# Patient Record
Sex: MALE | Race: WHITE | Employment: FULL TIME | URBAN - METROPOLITAN AREA
[De-identification: names, ages, dates, MRNs, and addresses within clinical notes are randomized per-mention and may not be internally consistent; named-entity substitution may affect disease eponyms.]

---

## 2019-10-21 ENCOUNTER — OFFICE VISIT (OUTPATIENT)
Dept: CARDIOLOGY CLINIC | Age: 44
End: 2019-10-21

## 2019-10-21 VITALS
HEIGHT: 69 IN | SYSTOLIC BLOOD PRESSURE: 124 MMHG | DIASTOLIC BLOOD PRESSURE: 80 MMHG | WEIGHT: 220 LBS | OXYGEN SATURATION: 96 % | HEART RATE: 72 BPM | BODY MASS INDEX: 32.58 KG/M2 | RESPIRATION RATE: 14 BRPM

## 2019-10-21 DIAGNOSIS — R42 VERTIGO: ICD-10-CM

## 2019-10-21 DIAGNOSIS — R42 DIZZINESS: Primary | ICD-10-CM

## 2019-10-21 DIAGNOSIS — R43.9 DYSOSMIA: ICD-10-CM

## 2019-10-21 NOTE — PROGRESS NOTES
HISTORY OF PRESENT ILLNESS  Justen Monte is a 38 y.o. male. He is seen for initial evaluation of dizziness. Five days ago he had dizziness starting in the morning and persisting for probably ten to twelve hours. He actually drove to [place] as part of his job that day. He noticed that if he kept his head straight looking at the road he did not experience symptoms, but when he moved his head from side to side the dizziness became problematic. When he first experienced the dizziness in the morning it did seem like things were moving and possibly spinning. He has had several minor episodes in the past but they have been short-lived. He is on no medications and has no treated health problems. He does have symptoms of gastric reflux. He drinks twelve to fifteen cups of coffee per day. He has not smoked cigarettes in six years. He walks a lot without chest pain or shortness of breath but does spend about five hours a day in his car. He drinks alcohol on occasion. He has no family history of premature heart disease or unusual diseases. He has occasional mild chest discomfort which occurs every day and used to be worse and apparently, he saw a cardiologist in [place] last year and had a normal EKG and stress test. I do not have those records. He still has tingling in the fingers of his left hand. He also had an episode four to five weeks ago where he woke up in the middle of the night with a very unusual smell like an electrical fire. He checked all the wiring in his house and there was no problem. The smell was so intense that it woke him up. It has happened two to three times since but not quite as bad. He used to have headaches that were quite severe but does not have these at present. HPI  Patient Active Problem List   Diagnosis Code    Dizziness of unknown etiology R42    Vertigo R42    Dysosmia R43.9       History reviewed. No pertinent past medical history.   Past Surgical History: Procedure Laterality Date    HX ACL RECONSTRUCTION         Review of Systems   Cardiovascular: Positive for chest pain. Neurological: Positive for dizziness and tingling. All other systems reviewed and are negative. Visit Vitals  /80 (BP 1 Location: Left arm, BP Patient Position: Sitting)   Pulse 72   Resp 14   Ht 5' 9\" (1.753 m)   Wt 220 lb (99.8 kg)   SpO2 96%   BMI 32.49 kg/m²       Physical Exam   Constitutional: He is oriented to person, place, and time. He appears well-nourished. HENT:   Head: Atraumatic. Eyes: Conjunctivae are normal.   Neck: Neck supple. Cardiovascular: Normal rate, regular rhythm and normal heart sounds. Exam reveals no gallop and no friction rub. No murmur heard. Pulmonary/Chest: Breath sounds normal. He has no wheezes. Abdominal: Bowel sounds are normal. There is no tenderness. Musculoskeletal: He exhibits no edema. Neurological: He is alert and oriented to person, place, and time. No cranial nerve deficit. Skin: Skin is dry. Psychiatric: His behavior is normal.   Nursing note and vitals reviewed. ASSESSMENT and PLAN  The dizziness that he experienced recently does sound like an inner ear problem causing some vertigo especially since the symptoms were improved if he kept his head straight but made worse if he turned it from side to side. However, I am quite concerned about his other symptom of dysosmia. Because of the association of these two symptoms, I feel that he should see a neurologist. I will schedule this today. I will also order a CT scan of the head with contrast to rule out any other processes. He has no signs or symptoms of cardiac disease and I do not feel he needs further cardiac testing at this time. I will also order complete blood work today and we will call him regarding the results of all tests. I will see him back as needed.

## 2019-10-22 LAB
ALBUMIN SERPL-MCNC: 4.3 G/DL (ref 3.5–5.5)
ALBUMIN/GLOB SERPL: 1.5 {RATIO} (ref 1.2–2.2)
ALP SERPL-CCNC: 92 IU/L (ref 39–117)
ALT SERPL-CCNC: 23 IU/L (ref 0–44)
AST SERPL-CCNC: 19 IU/L (ref 0–40)
BILIRUB SERPL-MCNC: <0.2 MG/DL (ref 0–1.2)
BUN SERPL-MCNC: 9 MG/DL (ref 6–24)
BUN/CREAT SERPL: 8 (ref 9–20)
CALCIUM SERPL-MCNC: 9.3 MG/DL (ref 8.7–10.2)
CHLORIDE SERPL-SCNC: 106 MMOL/L (ref 96–106)
CO2 SERPL-SCNC: 22 MMOL/L (ref 20–29)
CREAT SERPL-MCNC: 1.09 MG/DL (ref 0.76–1.27)
ERYTHROCYTE [DISTWIDTH] IN BLOOD BY AUTOMATED COUNT: 13.2 % (ref 12.3–15.4)
ERYTHROCYTE [SEDIMENTATION RATE] IN BLOOD BY WESTERGREN METHOD: 17 MM/HR (ref 0–15)
GLOBULIN SER CALC-MCNC: 2.9 G/DL (ref 1.5–4.5)
GLUCOSE SERPL-MCNC: 85 MG/DL (ref 65–99)
HCT VFR BLD AUTO: 45 % (ref 37.5–51)
HGB BLD-MCNC: 14.8 G/DL (ref 13–17.7)
MCH RBC QN AUTO: 30.1 PG (ref 26.6–33)
MCHC RBC AUTO-ENTMCNC: 32.9 G/DL (ref 31.5–35.7)
MCV RBC AUTO: 92 FL (ref 79–97)
PLATELET # BLD AUTO: 313 X10E3/UL (ref 150–450)
POTASSIUM SERPL-SCNC: 4.9 MMOL/L (ref 3.5–5.2)
PROT SERPL-MCNC: 7.2 G/DL (ref 6–8.5)
RBC # BLD AUTO: 4.91 X10E6/UL (ref 4.14–5.8)
SODIUM SERPL-SCNC: 143 MMOL/L (ref 134–144)
TSH SERPL DL<=0.005 MIU/L-ACNC: 1.7 UIU/ML (ref 0.45–4.5)
WBC # BLD AUTO: 6.8 X10E3/UL (ref 3.4–10.8)

## 2019-10-24 ENCOUNTER — OFFICE VISIT (OUTPATIENT)
Dept: NEUROLOGY | Age: 44
End: 2019-10-24

## 2019-10-24 VITALS
SYSTOLIC BLOOD PRESSURE: 118 MMHG | OXYGEN SATURATION: 96 % | BODY MASS INDEX: 32.29 KG/M2 | DIASTOLIC BLOOD PRESSURE: 75 MMHG | HEIGHT: 69 IN | WEIGHT: 218 LBS | HEART RATE: 84 BPM

## 2019-10-24 DIAGNOSIS — R43.9 DYSOSMIA: Primary | ICD-10-CM

## 2019-10-24 DIAGNOSIS — Z78.9 EXCESSIVE CAFFEINE INTAKE: ICD-10-CM

## 2019-10-24 DIAGNOSIS — R53.83 FATIGUE, UNSPECIFIED TYPE: ICD-10-CM

## 2019-10-24 DIAGNOSIS — R42 VERTIGO: ICD-10-CM

## 2019-10-24 NOTE — PROGRESS NOTES
Neurology Consult Note      HISTORY PROVIDED BY: patient    Chief Complaint:   Chief Complaint   Patient presents with    New Patient     dizzy, latharigic, smells burning occasionally      Subjective:    Annie Larkin is a 38 y.o. right handed male who presents in consultation for dizziness, lethargy, and smelling burning smell. Started last fall with CP, worked up in Cardiology, no etiology found. Sxs improved, but did not go away, still has tightness in chest.  Now c/o feeling fatigued, no energy. A couple of months ago started feeling dizzy, typically only for a few seconds, but last week developed dizziness that lasted most of the day. Pt clarifies dizziness as lightheadedness on a regular basis, can occur any position or activity, even at rest.  The episode last Wednesday was more like severe imbalance, not room spinning, felt like he might fall over and was worse with head turning such as with driving. No change in hearing or ringing in ears, no h/o vertigo. On a couple of occasions he has smelled an electrical burning smell. He has also noticed certain smells do not smell right. He has seen his cardiologist, Dr. Gisell Koenig, who did not find an etiology and suggested he see neurology. No family h/o seizure or stroke, +LOC in childhood with breath holding, passed out and hit head, passed out siphoning gas into lungs and was hospitalized. No h/o CNS infection. No febrile, Normal preg/del. He is sleeping well at night, gets at least 6 hours of sleep a night, snores sometimes, no apneas witnessed. Has h/o \"migraines\", severe HA would wake him at night, +N/V/Photophobia, improved after dec caffeine, but is still drinking 12-15 cups a day, a full pot in AM and then again in afternoons, and before he goes to bed, still with occ mild HA.      Note from Dr. Gisell Koenig 10/21/2019 reviewed-CT of the head ordered, labs completed including CMP and CBC that are unremarkable, TSH is normal, ESR 17.    Past Medical History:   Diagnosis Date    Headache       Past Surgical History:   Procedure Laterality Date    HX ACL RECONSTRUCTION        Social History     Socioeconomic History    Marital status: UNKNOWN     Spouse name: Not on file    Number of children: Not on file    Years of education: Not on file    Highest education level: Not on file   Occupational History    Not on file   Social Needs    Financial resource strain: Not on file    Food insecurity:     Worry: Not on file     Inability: Not on file    Transportation needs:     Medical: Not on file     Non-medical: Not on file   Tobacco Use    Smoking status: Former Smoker    Smokeless tobacco: Never Used   Substance and Sexual Activity    Alcohol use: Yes     Comment: 1-5 a month    Drug use: Never    Sexual activity: Not on file   Lifestyle    Physical activity:     Days per week: Not on file     Minutes per session: Not on file    Stress: Not on file   Relationships    Social connections:     Talks on phone: Not on file     Gets together: Not on file     Attends Hoahaoism service: Not on file     Active member of club or organization: Not on file     Attends meetings of clubs or organizations: Not on file     Relationship status: Not on file    Intimate partner violence:     Fear of current or ex partner: Not on file     Emotionally abused: Not on file     Physically abused: Not on file     Forced sexual activity: Not on file   Other Topics Concern    Not on file   Social History Narrative    Not on file     Family History   Problem Relation Age of Onset    Dementia Father     Heart Disease Sister     Heart Disease Brother          Objective:   Review of Systems   Constitutional: Positive for malaise/fatigue. HENT: Positive for tinnitus (occ once a month, high pitch hum). Eyes: Negative. Cardiovascular: Positive for chest pain and leg swelling (After on his feet). Gastrointestinal: Negative. Genitourinary: Negative. Musculoskeletal: Positive for joint pain and myalgias. Skin: Negative. Neurological: Positive for weakness. Endo/Heme/Allergies: Negative. Psychiatric/Behavioral: Positive for memory loss (Trouble with names). Allergies   Allergen Reactions    Penicillins Swelling        Meds:  No outpatient medications prior to visit. No facility-administered medications prior to visit. Imaging:  MRI Results (most recent):  No results found for this or any previous visit. CT Results (most recent):  No results found for this or any previous visit. Reviewed records in [employer] and [employer] tab today    Lab Review   Results for orders placed or performed in visit on 40/55/90   METABOLIC PANEL, COMPREHENSIVE   Result Value Ref Range    Glucose 85 65 - 99 mg/dL    BUN 9 6 - 24 mg/dL    Creatinine 1.09 0.76 - 1.27 mg/dL    GFR est non-AA 82 >59 mL/min/1.73    GFR est AA 94 >59 mL/min/1.73    BUN/Creatinine ratio 8 (L) 9 - 20    Sodium 143 134 - 144 mmol/L    Potassium 4.9 3.5 - 5.2 mmol/L    Chloride 106 96 - 106 mmol/L    CO2 22 20 - 29 mmol/L    Calcium 9.3 8.7 - 10.2 mg/dL    Protein, total 7.2 6.0 - 8.5 g/dL    Albumin 4.3 3.5 - 5.5 g/dL    GLOBULIN, TOTAL 2.9 1.5 - 4.5 g/dL    A-G Ratio 1.5 1.2 - 2.2    Bilirubin, total <0.2 0.0 - 1.2 mg/dL    Alk.  phosphatase 92 39 - 117 IU/L    AST (SGOT) 19 0 - 40 IU/L    ALT (SGPT) 23 0 - 44 IU/L   CBC W/O DIFF   Result Value Ref Range    WBC 6.8 3.4 - 10.8 x10E3/uL    RBC 4.91 4.14 - 5.80 x10E6/uL    HGB 14.8 13.0 - 17.7 g/dL    HCT 45.0 37.5 - 51.0 %    MCV 92 79 - 97 fL    MCH 30.1 26.6 - 33.0 pg    MCHC 32.9 31.5 - 35.7 g/dL    RDW 13.2 12.3 - 15.4 %    PLATELET 546 511 - 965 x10E3/uL   TSH REFLEX TO T4   Result Value Ref Range    TSH 1.700 0.450 - 4.500 uIU/mL   SED RATE (ESR)   Result Value Ref Range    Sed rate (ESR) 17 (H) 0 - 15 mm/hr        Exam:  Visit Vitals  /75   Pulse 84   Ht 5' 9\" (1.753 m)   Wt 98.9 kg (218 lb)   SpO2 96%   BMI 32.19 kg/m²     General:  Alert, cooperative, no distress. Head:  Normocephalic, without obvious abnormality, atraumatic. Respiratory:  Heart:   Non labored breathing  Regular rate and rhythm, no murmurs   Neck:   2+ carotids, no bruits   Extremities: Warm, no cyanosis or edema. Pulses: 2+ radial pulses. Neurologic:  MS: Alert and oriented x 4, speech intact. Language intact. Attention and fund of knowledge appropriate. Recent and remote memory intact. Cranial Nerves:  II: visual fields Full to confrontation   II: pupils Equal, round, reactive to light   II: optic disc    III,VII: ptosis none   III,IV,VI: extraocular muscles  EOMI, no nystagmus or diplopia   V: facial light touch sensation  normal   VII: facial muscle function   symmetric   VIII: hearing intact   IX: soft palate elevation  normal   XI: trapezius strength  5/5   XI: sternocleidomastoid strength 5/5   XII: tongue  Midline     Motor: normal bulk and tone, no tremor              Strength: 5/5 throughout, no PD  Sensory: intact to LT, PP  Coordination: FTN and HTS intact, WILLARD intact,Romberg negative  Gait: normal gait, able to tandem walk  Reflexes: 2+ symmetric, toes downgoing           Assessment/Plan   Pt is a 38 y.o. right handed male c/o fatigue, no energy, dizziness that began a couple of months ago, typically brief and more of a lightheadedness, but last week had sudden onset of severe imbalance, not room spinning, felt like he might fall over and was worse with head turning and lasted the entire day. Additionally, he reports intermittent having phantom smell of electrical burning smell and dysosmia. No obvious seizure or stroke risk factors. Exam is nonfocal and unremarkable. Spell last week of severe imbalance could have been vestibulopathy, but cannot exclude a central etiology and given this along with phantosmia and dysosmia, recommend MRI brain w/wo contrast to fully exclude structural etiology such as stroke or mass.   I will also order an EEG to assess for evidence of underlying epilepsy as an etiology for spells, fatigue, and phantom smells. Regarding his fatigue, this could be related to poor sleep due to excessive caffeine intake and I recommend he start tapering his caffeine to 8 to 16 ounces in the morning. I will also check additional blood work looking for other metabolic etiologies of his fatigue and dizziness including B12/MMA, vitamin D, hemoglobin A1c and I will check a lipid profile to assess for stroke risk factors. Follow-up was made for next available in approximately 3 months, patient is instructed to call after his EEG to let us know he had this done and he should expect a call regarding additional test results. ICD-10-CM ICD-9-CM    1. Dysosmia R43.9 781.1 EEG      MRI BRAIN W WO CONT   2. Vertigo R42 780.4 EEG      MRI BRAIN W WO CONT   3. Fatigue, unspecified type R53.83 780.79 EEG      VITAMIN B12      METHYLMALONIC ACID      VITAMIN D, 25 HYDROXY      LIPID PANEL      HEMOGLOBIN A1C WITH EAG      MRI BRAIN W WO CONT   4. Excessive caffeine intake Z78.9 V49.89        Signed:   Servando Nguyen MD  10/24/2019

## 2019-10-24 NOTE — PATIENT INSTRUCTIONS
Office Policies  · Phone calls/patient messages:  Please allow up to 24 hours for someone in the office to contact you about your call or message. Be mindful your provider may be out of the office or your message may require further review. We encourage you to use [employer] for your messages as this is a faster, more efficient way to communicate with our office  · Medication Refills:  Prescription medications require up to 48 business hours to process. We encourage you to use [employer] for your refills. For controlled medications: Please allow up to 72 business hours to process. Certain medications may require you to  a written prescription at our office. NO narcotic/controlled medications will be prescribed after 4pm Monday through Friday or on weekends  · Form/Paperwork Completion:  Please note there is a $25 fee for all paperwork completed by our providers. We ask that you allow 7-14 business days. Pre-payment is due prior to picking up/faxing the completed form. You may also download your forms to [employer] to have your doctor print off.

## 2019-10-25 ENCOUNTER — TELEPHONE (OUTPATIENT)
Dept: NEUROLOGY | Age: 44
End: 2019-10-25

## 2019-10-25 NOTE — TELEPHONE ENCOUNTER
Dr. Praveena Pena [facility] surgeon) calling to speak to Dr. Trisha See in regards to patient -- please advise.       Best # [phone]

## 2019-11-01 ENCOUNTER — HOSPITAL ENCOUNTER (OUTPATIENT)
Dept: NEUROLOGY | Age: 44
Discharge: HOME | End: 2019-11-01
Attending: PSYCHIATRY & NEUROLOGY
Payer: COMMERCIAL

## 2019-11-01 DIAGNOSIS — R43.9 DYSOSMIA: ICD-10-CM

## 2019-11-01 DIAGNOSIS — R42 DIZZINESS OF UNKNOWN ETIOLOGY: ICD-10-CM

## 2019-11-01 DIAGNOSIS — R53.83 FATIGUE, UNSPECIFIED TYPE: ICD-10-CM

## 2019-11-01 DIAGNOSIS — R42 VERTIGO: ICD-10-CM

## 2019-11-01 PROCEDURE — 95816 EEG AWAKE AND DROWSY: CPT

## 2019-11-02 ENCOUNTER — HOSPITAL ENCOUNTER (OUTPATIENT)
Dept: MRI IMAGING | Age: 44
Discharge: HOME | End: 2019-11-02
Attending: PSYCHIATRY & NEUROLOGY
Payer: COMMERCIAL

## 2019-11-02 VITALS — HEIGHT: 69 IN | WEIGHT: 218 LBS | BODY MASS INDEX: 32.29 KG/M2

## 2019-11-02 DIAGNOSIS — R42 VERTIGO: ICD-10-CM

## 2019-11-02 DIAGNOSIS — R43.9 DYSOSMIA: ICD-10-CM

## 2019-11-02 DIAGNOSIS — R53.83 FATIGUE, UNSPECIFIED TYPE: ICD-10-CM

## 2019-11-02 PROCEDURE — 74011250636 HC RX REV CODE- 250/636: Performed by: PSYCHIATRY & NEUROLOGY

## 2019-11-02 PROCEDURE — 70553 MRI BRAIN STEM W/O & W/DYE: CPT

## 2019-11-02 PROCEDURE — A9575 INJ GADOTERATE MEGLUMI 0.1ML: HCPCS | Performed by: PSYCHIATRY & NEUROLOGY

## 2019-11-02 RX ORDER — GADOTERATE MEGLUMINE 376.9 MG/ML
15 INJECTION INTRAVENOUS
Status: COMPLETED | OUTPATIENT
Start: 2019-11-02 | End: 2019-11-02

## 2019-11-02 RX ADMIN — GADOTERATE MEGLUMINE 15 ML: 376.9 INJECTION INTRAVENOUS at 17:04

## 2019-11-06 ENCOUNTER — TELEPHONE (OUTPATIENT)
Dept: NEUROLOGY | Age: 44
End: 2019-11-06

## 2019-11-06 NOTE — TELEPHONE ENCOUNTER
MRI brain w/wo contrast 11/2/19 is normal.   EEG 11/1/19 is normal.     Tomas - Please call pt: Great news, MRI brain and EEG are completely normal.  I ordered labs at his appt and I do not see the results, did he have these done somewhere else?

## 2019-11-06 NOTE — TELEPHONE ENCOUNTER
----- Message from Suze Robbins sent at 11/6/2019 12:02 PM EST -----  Regarding: Dr. Raul Davenport  General Message/Vendor Calls    Caller's first and last name:  Mrs. Bhavani Ward, pt's wife,    Reason for call:  Test results    Callback required yes/no and why:  yes    Best contact number(s):  [phone] pt's number [phone]    Details to clarify the request:  Requesting EEG and MRI test results done over the weekend.     Suze Robbins

## 2019-11-06 NOTE — TELEPHONE ENCOUNTER
I gave patient these results. He expressed understanding. Patient stated he did attempt to get his labs done and \"passed out\". Patient agrees to try again to get his labs drawn over the next few days.

## 2019-11-06 NOTE — PROCEDURES
PROCEDURE: ROUTINE INPATIENT EEG  NAME:   Bryan Sawyer  ACCOUNT NUMBER : [de-identified]  MRN:   [id number]  DATE OF SERVICE: 11/1/19    HISTORY/INDICATION: Pt is a 38 y.o. right handed male c/o fatigue, no energy, dizziness that began a couple of months ago, typically brief and more of a lightheadedness, but last week had spell of sudden onset of severe imbalance. Additionally, he reports intermittent phantom electrical burning smell. EEG is ordered to assess for evidence of underlying epilepsy. MEDICATIONS: None    CONDITIONS OF RECORDING: This is a routine 21-channel EEG recording performed in accordance with the international 10-20 system with one channel devoted to limited EKG. This study was done during a state of wakefulness. Photic stimulation and hyperventilation were performed as activating procedures. DESCRIPTION:   Upon maximal arousal the posterior dominant rhythm has a frequency of 9Hz with an amplitude of 20uV. This activity is symmetric over the bilateral posterior derivations and attenuates with eye opening. Photic stimulation and hyperventilation do not significantly alter the tracing. No sleep is seen. There are no focal abnormalities, epileptiform discharges, or electrographic seizures seen. INTERPRETATION: Normal awake EEG    CLINICAL CORRELATION: A normal EEG does not definitively exclude a diagnosis of epilepsy if clinical suspicion is high consider sleep deprived EEG.     Anais Lamar MD

## 2021-08-03 PROBLEM — R42 DIZZINESS OF UNKNOWN ETIOLOGY: Status: RESOLVED | Noted: 2019-10-21 | Resolved: 2021-08-03

## 2021-11-18 ENCOUNTER — OFFICE VISIT (OUTPATIENT)
Dept: ORTHOPEDIC SURGERY | Age: 46
End: 2021-11-18
Payer: COMMERCIAL

## 2021-11-18 VITALS — WEIGHT: 209 LBS | BODY MASS INDEX: 29.92 KG/M2 | HEIGHT: 70 IN

## 2021-11-18 DIAGNOSIS — M25.561 PAIN OF RIGHT KNEE AFTER INJURY: ICD-10-CM

## 2021-11-18 DIAGNOSIS — M25.561 CHRONIC PAIN OF RIGHT KNEE: Primary | ICD-10-CM

## 2021-11-18 DIAGNOSIS — M23.51 OLD COMPLETE ACL TEAR, RIGHT: ICD-10-CM

## 2021-11-18 DIAGNOSIS — G89.29 CHRONIC PAIN OF RIGHT KNEE: Primary | ICD-10-CM

## 2021-11-18 PROCEDURE — 99203 OFFICE O/P NEW LOW 30 MIN: CPT | Performed by: ORTHOPAEDIC SURGERY

## 2021-11-18 NOTE — LETTER
11/18/2021 9:00 AM    [address]  [facility] [id number] [phone]    Right knee is stable with full range of motion Mr. Carlos Johnson is cleared for all activity           Sincerely,      Brielle Vyas MD

## 2021-11-18 NOTE — PROGRESS NOTES
Mark Kendall (: 1975) is a 51 y.o. male, patient, here for evaluation of the following chief complaint(s):  No chief complaint on file. HPI:    ***    Allergies   Allergen Reactions    Penicillins Swelling       No current outpatient medications on file. No current facility-administered medications for this visit. Past Medical History:   Diagnosis Date    Headache         Past Surgical History:   Procedure Laterality Date    HX ACL RECONSTRUCTION         Family History   Problem Relation Age of Onset    Dementia Father         Onset in late 66's, currently 80yo    Other Sister         Heart palpatations     Diabetes Brother     Other Mother         He is not certain    No Known Problems Daughter     Migraines Daughter         Social History     Socioeconomic History    Marital status:      Spouse name: Not on file    Number of children: Not on file    Years of education: Not on file    Highest education level: Not on file   Occupational History    Occupation: [employer] for a [employer]    Tobacco Use    Smoking status: Former Smoker     Quit date:      Years since quittin.8    Smokeless tobacco: Never Used   Substance and Sexual Activity    Alcohol use: Yes     Comment: 1-6 a month    Drug use: Never    Sexual activity: Not on file   Other Topics Concern    Not on file   Social History Narrative    , lives in [address] [place] [address] Health     Financial Resource Strain:     Difficulty of Paying Living Expenses: Not on file   Food Insecurity:     Worried About [facility] in the Last Year: Not on file    [facility] in the Last Year: Not on file   Transportation Needs:     Lack of Transportation (Medical): Not on file    Lack of Transportation (Non-Medical):  Not on file   Physical Activity:     Days of Exercise per Week: Not on file    Minutes of Exercise per Session: Not on file   Stress:     Feeling of Stress : Not on file   Social Connections:     Frequency of Communication with Friends and Family: Not on file    Frequency of Social Gatherings with Friends and Family: Not on file    Attends Holiness Services: Not on file    Active Member of Clubs or Organizations: Not on file    Attends Club or Organization Meetings: Not on file    Marital Status: Not on file   Intimate Partner Violence:     Fear of Current or Ex-Partner: Not on file    Emotionally Abused: Not on file    Physically Abused: Not on file    Sexually Abused: Not on file   Housing Stability:     Unable to Pay for Housing in the Last Year: Not on file    Number of [place] in the Last Year: Not on file    Unstable Housing in the Last Year: Not on file       Review of Systems    Vitals: There were no vitals taken for this visit. There is no height or weight on file to calculate BMI. Ortho Exam     ***    ASSESSMENT/PLAN:      1. Chronic pain of right knee       Below is the assessment and plan developed based on review of pertinent history, physical exam, labs, studies, and medications. ***    No follow-ups on file. An electronic signature was used to authenticate this note.   -- Emily Espinal MD

## 2021-11-19 NOTE — PROGRESS NOTES
Landon Escobedo (: 1975) is a 51 y.o. male, patient, here for evaluation of the following chief complaint(s):  Knee Pain (right)       HPI:    He presents today with no right knee pain. 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 He is status post ACL reconstruction. Allergies   Allergen Reactions    Penicillins Swelling       No current outpatient medications on file. No current facility-administered medications for this visit. Past Medical History:   Diagnosis Date    Headache         Past Surgical History:   Procedure Laterality Date    HX ACL RECONSTRUCTION         Family History   Problem Relation Age of Onset    Dementia Father         Onset in late 66's, currently 78yo    Other Sister         Heart palpatations     Diabetes Brother     Other Mother         He is not certain    No Known Problems Daughter     Migraines Daughter         Social History     Socioeconomic History    Marital status:      Spouse name: Not on file    Number of children: Not on file    Years of education: Not on file    Highest education level: Not on file   Occupational History    Occupation: [employer] for [employer]    Tobacco Use    Smoking status: Former Smoker     Quit date:      Years since quittin.9    Smokeless tobacco: Never Used   Substance and Sexual Activity    Alcohol use: Yes     Comment: 1-6 a month    Drug use: Never    Sexual activity: Not on file   Other Topics Concern    Not on file   Social History Narrative    , lives in [address] [place] [address]     Financial Resource Strain:     Difficulty of Paying Living Expenses: Not on file   Food Insecurity:     Worried About [address] in the Last Year: Not on file    [address] in the Last Year: Not on file   Transportation Needs:     Lack of Transportation (Medical):  Not on file    Lack of Transportation (Non-Medical): Not on file   Physical Activity:     Days of Exercise per Week: Not on file    Minutes of Exercise per Session: Not on file   Stress:     Feeling of Stress : Not on file   Social Connections:     Frequency of Communication with Friends and Family: Not on file    Frequency of Social Gatherings with Friends and Family: Not on file    Attends Yazidism Services: Not on file    Active Member of [address] [place] [address] or Organizations: Not on file    Attends Club or Organization Meetings: Not on file    Marital Status: Not on file   Intimate Partner Violence:     Fear of Current or Ex-Partner: Not on file    Emotionally Abused: Not on file    Physically Abused: Not on file    Sexually Abused: Not on file   Housing Stability:     Unable to Pay for Housing in the Last Year: Not on file    Number of [place] in the Last Year: Not on file    Unstable Housing in the Last Year: Not on file       Review of Systems   All other systems reviewed and are negative. Vitals:  Ht 5' 10\" (1.778 m)   Wt 209 lb (94.8 kg)   BMI 29.99 kg/m²    Body mass index is 29.99 kg/m². Ortho Exam     The patient is well-developed and well-nourished. The patient presents today in alert and oriented x3 with a normal mood and affect. The patient stands with a normal weightbearing line and walks with a normal gait. Right knee previous surgical incisions are well-healed with no sign of irritation or infection and look normal.  He does have full range of motion. His quadricep strength is well-maintained. There is no instability noted. There is a negative Lachman's and a negative Mark's. He reports no calf tenderness. His sensations are intact and his pulses are 2+. His skin is normal and well-healed. ASSESSMENT/PLAN:      1. Chronic pain of right knee  2. Pain of right knee after injury  3.  Old complete ACL tear, right       Below is the assessment and plan developed based on review of pertinent history, physical exam, labs, studies, and medications. We discussed the patient's right knee and surgical history. He is status post ACL reconstruction. The patient has no discomfort at this time. 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 I will see him back on an as-needed basis for his right knee. Return if symptoms worsen or fail to improve. An electronic signature was used to authenticate this note.   -- Adela Tom MD

## 2022-03-19 PROBLEM — R42 VERTIGO: Status: ACTIVE | Noted: 2019-10-21

## 2022-03-19 PROBLEM — R43.9 DYSOSMIA: Status: ACTIVE | Noted: 2019-10-21

## 2023-06-03 ENCOUNTER — HOSPITAL ENCOUNTER (EMERGENCY)
Facility: HOSPITAL | Age: 48
Discharge: HOME | End: 2023-06-03
Attending: EMERGENCY MEDICINE
Payer: COMMERCIAL

## 2023-06-03 VITALS
HEIGHT: 70 IN | DIASTOLIC BLOOD PRESSURE: 76 MMHG | OXYGEN SATURATION: 97 % | SYSTOLIC BLOOD PRESSURE: 115 MMHG | BODY MASS INDEX: 27.2 KG/M2 | WEIGHT: 190 LBS | RESPIRATION RATE: 16 BRPM | HEART RATE: 77 BPM | TEMPERATURE: 98.6 F

## 2023-06-03 DIAGNOSIS — S69.92XA FISHHOOK INJURY TO FINGER, LEFT, INITIAL ENCOUNTER: Primary | ICD-10-CM

## 2023-06-03 PROCEDURE — 99284 EMERGENCY DEPT VISIT MOD MDM: CPT

## 2023-06-03 PROCEDURE — 6360000002 HC RX W HCPCS: Performed by: EMERGENCY MEDICINE

## 2023-06-03 PROCEDURE — 90471 IMMUNIZATION ADMIN: CPT | Performed by: EMERGENCY MEDICINE

## 2023-06-03 PROCEDURE — 90714 TD VACC NO PRESV 7 YRS+ IM: CPT | Performed by: EMERGENCY MEDICINE

## 2023-06-03 PROCEDURE — 2500000003 HC RX 250 WO HCPCS: Performed by: EMERGENCY MEDICINE

## 2023-06-03 RX ORDER — LIDOCAINE HYDROCHLORIDE 10 MG/ML
5 INJECTION, SOLUTION EPIDURAL; INFILTRATION; INTRACAUDAL; PERINEURAL ONCE
Status: COMPLETED | OUTPATIENT
Start: 2023-06-03 | End: 2023-06-03

## 2023-06-03 RX ORDER — LIDOCAINE HYDROCHLORIDE 10 MG/ML
INJECTION, SOLUTION EPIDURAL; INFILTRATION; INTRACAUDAL; PERINEURAL
Status: DISCONTINUED
Start: 2023-06-03 | End: 2023-06-04 | Stop reason: HOSPADM

## 2023-06-03 RX ADMIN — CLOSTRIDIUM TETANI TOXOID ANTIGEN (FORMALDEHYDE INACTIVATED) AND CORYNEBACTERIUM DIPHTHERIAE TOXOID ANTIGEN (FORMALDEHYDE INACTIVATED) 0.5 ML: 5; 2 INJECTION, SUSPENSION INTRAMUSCULAR at 20:38

## 2023-06-03 RX ADMIN — LIDOCAINE HYDROCHLORIDE 5 ML: 10 INJECTION, SOLUTION EPIDURAL; INFILTRATION; INTRACAUDAL; PERINEURAL at 20:37
